# Patient Record
Sex: MALE | Race: ASIAN | Employment: FULL TIME | URBAN - METROPOLITAN AREA
[De-identification: names, ages, dates, MRNs, and addresses within clinical notes are randomized per-mention and may not be internally consistent; named-entity substitution may affect disease eponyms.]

---

## 2019-07-11 ENCOUNTER — APPOINTMENT (OUTPATIENT)
Dept: CT IMAGING | Facility: HOSPITAL | Age: 49
DRG: 638 | End: 2019-07-11
Attending: HOSPITALIST
Payer: COMMERCIAL

## 2019-07-11 ENCOUNTER — APPOINTMENT (OUTPATIENT)
Dept: ULTRASOUND IMAGING | Facility: HOSPITAL | Age: 49
DRG: 638 | End: 2019-07-11
Attending: HOSPITALIST
Payer: COMMERCIAL

## 2019-07-11 ENCOUNTER — HOSPITAL ENCOUNTER (INPATIENT)
Facility: HOSPITAL | Age: 49
LOS: 2 days | Discharge: HOME | DRG: 638 | End: 2019-07-13
Attending: INTERNAL MEDICINE | Admitting: HOSPITALIST
Payer: COMMERCIAL

## 2019-07-11 DIAGNOSIS — E10.65 TYPE 1 DIABETES MELLITUS WITH HYPERGLYCEMIA (HCC): Primary | ICD-10-CM

## 2019-07-11 LAB
ALBUMIN SERPL-MCNC: 3.6 G/DL (ref 3.4–5)
ALBUMIN/GLOB SERPL: 0.9 {RATIO} (ref 1–2)
ALP LIVER SERPL-CCNC: 123 U/L (ref 45–117)
ALT SERPL-CCNC: 639 U/L (ref 16–61)
ANION GAP SERPL CALC-SCNC: 9 MMOL/L (ref 0–18)
AST SERPL-CCNC: 496 U/L (ref 15–37)
BILIRUB SERPL-MCNC: 0.8 MG/DL (ref 0.1–2)
BUN BLD-MCNC: 16 MG/DL (ref 7–18)
BUN/CREAT SERPL: 21.1 (ref 10–20)
CALCIUM BLD-MCNC: 8.9 MG/DL (ref 8.5–10.1)
CHLORIDE SERPL-SCNC: 111 MMOL/L (ref 98–112)
CHOLEST SMN-MCNC: 180 MG/DL (ref ?–200)
CO2 SERPL-SCNC: 23 MMOL/L (ref 21–32)
CREAT BLD-MCNC: 0.76 MG/DL (ref 0.7–1.3)
EST. AVERAGE GLUCOSE BLD GHB EST-MCNC: 364 MG/DL (ref 68–126)
GLOBULIN PLAS-MCNC: 4.1 G/DL (ref 2.8–4.4)
GLUCOSE BLD-MCNC: 142 MG/DL (ref 70–99)
GLUCOSE BLD-MCNC: 225 MG/DL (ref 70–99)
GLUCOSE BLD-MCNC: 249 MG/DL (ref 70–99)
GLUCOSE BLD-MCNC: 267 MG/DL (ref 70–99)
GLUCOSE BLD-MCNC: 269 MG/DL (ref 70–99)
GLUCOSE BLD-MCNC: 285 MG/DL (ref 70–99)
HAV IGM SER QL: NONREACTIVE
HBA1C MFR BLD HPLC: 14.3 % (ref ?–5.7)
HBV CORE IGM SER QL: NONREACTIVE
HBV SURFACE AG SERPL QL IA: REACTIVE
HCV AB SERPL QL IA: NONREACTIVE
HDLC SERPL-MCNC: 14 MG/DL (ref 40–59)
LDLC SERPL CALC-MCNC: 93 MG/DL (ref ?–100)
M PROTEIN MFR SERPL ELPH: 7.7 G/DL (ref 6.4–8.2)
NONHDLC SERPL-MCNC: 166 MG/DL (ref ?–130)
OSMOLALITY SERPL CALC.SUM OF ELEC: 308 MOSM/KG (ref 275–295)
POTASSIUM SERPL-SCNC: 3.8 MMOL/L (ref 3.5–5.1)
SODIUM SERPL-SCNC: 143 MMOL/L (ref 136–145)
TRIGL SERPL-MCNC: 363 MG/DL (ref 30–149)
TSI SER-ACNC: 0.64 MIU/ML (ref 0.36–3.74)
VLDLC SERPL CALC-MCNC: 73 MG/DL (ref 0–30)

## 2019-07-11 PROCEDURE — 83516 IMMUNOASSAY NONANTIBODY: CPT | Performed by: INTERNAL MEDICINE

## 2019-07-11 PROCEDURE — 83036 HEMOGLOBIN GLYCOSYLATED A1C: CPT | Performed by: HOSPITALIST

## 2019-07-11 PROCEDURE — 84443 ASSAY THYROID STIM HORMONE: CPT | Performed by: INTERNAL MEDICINE

## 2019-07-11 PROCEDURE — 82947 ASSAY GLUCOSE BLOOD QUANT: CPT | Performed by: INTERNAL MEDICINE

## 2019-07-11 PROCEDURE — 80061 LIPID PANEL: CPT | Performed by: INTERNAL MEDICINE

## 2019-07-11 PROCEDURE — 70450 CT HEAD/BRAIN W/O DYE: CPT | Performed by: HOSPITALIST

## 2019-07-11 PROCEDURE — 82962 GLUCOSE BLOOD TEST: CPT

## 2019-07-11 PROCEDURE — 84681 ASSAY OF C-PEPTIDE: CPT | Performed by: INTERNAL MEDICINE

## 2019-07-11 PROCEDURE — 80053 COMPREHEN METABOLIC PANEL: CPT | Performed by: HOSPITALIST

## 2019-07-11 PROCEDURE — 86341 ISLET CELL ANTIBODY: CPT | Performed by: INTERNAL MEDICINE

## 2019-07-11 PROCEDURE — 80074 ACUTE HEPATITIS PANEL: CPT | Performed by: HOSPITALIST

## 2019-07-11 PROCEDURE — 86337 INSULIN ANTIBODIES: CPT | Performed by: INTERNAL MEDICINE

## 2019-07-11 PROCEDURE — 76700 US EXAM ABDOM COMPLETE: CPT | Performed by: HOSPITALIST

## 2019-07-11 RX ORDER — DEXTROSE MONOHYDRATE 25 G/50ML
50 INJECTION, SOLUTION INTRAVENOUS
Status: DISCONTINUED | OUTPATIENT
Start: 2019-07-11 | End: 2019-07-13

## 2019-07-11 RX ORDER — HEPARIN SODIUM 5000 [USP'U]/ML
5000 INJECTION, SOLUTION INTRAVENOUS; SUBCUTANEOUS EVERY 12 HOURS SCHEDULED
Status: DISCONTINUED | OUTPATIENT
Start: 2019-07-12 | End: 2019-07-13

## 2019-07-11 RX ORDER — POTASSIUM CHLORIDE 20 MEQ/1
40 TABLET, EXTENDED RELEASE ORAL ONCE
Status: DISCONTINUED | OUTPATIENT
Start: 2019-07-11 | End: 2019-07-13

## 2019-07-11 RX ORDER — ONDANSETRON 2 MG/ML
4 INJECTION INTRAMUSCULAR; INTRAVENOUS EVERY 6 HOURS PRN
Status: DISCONTINUED | OUTPATIENT
Start: 2019-07-11 | End: 2019-07-13

## 2019-07-11 RX ORDER — ONDANSETRON 2 MG/ML
4 INJECTION INTRAMUSCULAR; INTRAVENOUS EVERY 6 HOURS PRN
Status: DISCONTINUED | OUTPATIENT
Start: 2019-07-11 | End: 2019-07-11

## 2019-07-11 RX ORDER — SODIUM CHLORIDE 9 MG/ML
INJECTION, SOLUTION INTRAVENOUS CONTINUOUS
Status: DISCONTINUED | OUTPATIENT
Start: 2019-07-11 | End: 2019-07-13

## 2019-07-11 RX ORDER — ACETAMINOPHEN 500 MG
1000 TABLET ORAL EVERY 6 HOURS PRN
Status: DISCONTINUED | OUTPATIENT
Start: 2019-07-11 | End: 2019-07-12

## 2019-07-11 NOTE — PROGRESS NOTES
Patient is alert and oriented. Oxygen saturation WDL on room air. Vital signs stable. Reporting headache. Feeling week. Resting in bed. Awaiting abdominal ultrasound and head CT. Initiated insulin administration education.  Patient is not perceptive to e

## 2019-07-11 NOTE — CONSULTS
ENDOCRINOLOGY CONSULTATION    Attending physician:  Debbie Savage MD  Consulting physican:  Demi Aldana MD    Admission Date:  7/11/2019  Consultation Date:  7/11/2019      Reason for consultation: Mgmt of new onset DM    Chief Complaint:  Admit glucose (DEX4) oral liquid 15 g 15 g Oral Q15 Min PRN   Or      Glucose-Vitamin C (DEX-4) 4-6 GM-MG chewable tab 4 tablet 4 tablet Oral Q15 Min PRN   Or      dextrose 50 % injection 50 mL 50 mL Intravenous Q15 Min PRN   Or      glucose (DEX4) oral liquid Latest Ref Rng & Units 7/11/2019 7/11/2019 7/11/2019 7/11/2019           4:00 PM  3:10 PM  3:06 PM 10:23 AM   GLUCOSE             Test Strip Lot #      Numeric       Test Strip Expiration Date      Date       Glucose      70 - 99 mg/dL 269 (H) 285 (H)  509 hyperglycemia. HgA1C 14.3%. New diagnosis presenting with wt loss, polyuria and polydipsia. 2. Long term insulin use   3.  Elevated liver enzymes      · Adjust regimen as follows:     Levemir 16 Units at bedtime (start with dinner today for first time do

## 2019-07-12 ENCOUNTER — APPOINTMENT (OUTPATIENT)
Dept: ULTRASOUND IMAGING | Facility: HOSPITAL | Age: 49
DRG: 638 | End: 2019-07-12
Attending: INTERNAL MEDICINE
Payer: COMMERCIAL

## 2019-07-12 LAB
ALBUMIN SERPL-MCNC: 3.5 G/DL (ref 3.4–5)
ALBUMIN/GLOB SERPL: 0.9 {RATIO} (ref 1–2)
ALP LIVER SERPL-CCNC: 108 U/L (ref 45–117)
ALT SERPL-CCNC: 617 U/L (ref 16–61)
ANION GAP SERPL CALC-SCNC: 5 MMOL/L (ref 0–18)
AST SERPL-CCNC: 620 U/L (ref 15–37)
BILIRUB SERPL-MCNC: 1 MG/DL (ref 0.1–2)
BUN BLD-MCNC: 18 MG/DL (ref 7–18)
BUN/CREAT SERPL: 25 (ref 10–20)
CALCIUM BLD-MCNC: 8.6 MG/DL (ref 8.5–10.1)
CERULOPLASMIN SERPL-MCNC: 30.3 MG/DL (ref 20–60)
CHLORIDE SERPL-SCNC: 110 MMOL/L (ref 98–112)
CO2 SERPL-SCNC: 27 MMOL/L (ref 21–32)
CORTIS SERPL-MCNC: 23.4 UG/DL
CREAT BLD-MCNC: 0.72 MG/DL (ref 0.7–1.3)
CREAT UR-SCNC: 170 MG/DL
DEPRECATED HBV CORE AB SER IA-ACNC: 445.3 NG/ML (ref 30–490)
GLOBULIN PLAS-MCNC: 4.1 G/DL (ref 2.8–4.4)
GLUCOSE BLD-MCNC: 203 MG/DL (ref 70–99)
GLUCOSE BLD-MCNC: 231 MG/DL (ref 70–99)
GLUCOSE BLD-MCNC: 232 MG/DL (ref 70–99)
GLUCOSE BLD-MCNC: 241 MG/DL (ref 70–99)
GLUCOSE BLD-MCNC: 284 MG/DL (ref 70–99)
HBV CORE AB SERPL QL IA: REACTIVE
HBV CORE IGM SER QL: NONREACTIVE
IRON SATURATION: 46 % (ref 20–50)
IRON SERPL-MCNC: 155 UG/DL (ref 65–175)
M PROTEIN MFR SERPL ELPH: 7.6 G/DL (ref 6.4–8.2)
MICROALBUMIN UR-MCNC: 8.33 MG/DL
MICROALBUMIN/CREAT 24H UR-RTO: 49 UG/MG (ref ?–30)
OSMOLALITY SERPL CALC.SUM OF ELEC: 304 MOSM/KG (ref 275–295)
POTASSIUM SERPL-SCNC: 3.7 MMOL/L (ref 3.5–5.1)
POTASSIUM SERPL-SCNC: 3.7 MMOL/L (ref 3.5–5.1)
SODIUM SERPL-SCNC: 142 MMOL/L (ref 136–145)
TOTAL IRON BINDING CAPACITY: 337 UG/DL (ref 240–450)
TRANSFERRIN SERPL-MCNC: 226 MG/DL (ref 200–360)

## 2019-07-12 PROCEDURE — 83516 IMMUNOASSAY NONANTIBODY: CPT | Performed by: INTERNAL MEDICINE

## 2019-07-12 PROCEDURE — 87350 HEPATITIS BE AG IA: CPT | Performed by: INTERNAL MEDICINE

## 2019-07-12 PROCEDURE — 86038 ANTINUCLEAR ANTIBODIES: CPT | Performed by: INTERNAL MEDICINE

## 2019-07-12 PROCEDURE — 82390 ASSAY OF CERULOPLASMIN: CPT | Performed by: INTERNAL MEDICINE

## 2019-07-12 PROCEDURE — 82570 ASSAY OF URINE CREATININE: CPT | Performed by: INTERNAL MEDICINE

## 2019-07-12 PROCEDURE — 84132 ASSAY OF SERUM POTASSIUM: CPT | Performed by: HOSPITALIST

## 2019-07-12 PROCEDURE — 82962 GLUCOSE BLOOD TEST: CPT

## 2019-07-12 PROCEDURE — 82104 ALPHA-1-ANTITRYPSIN PHENO: CPT | Performed by: INTERNAL MEDICINE

## 2019-07-12 PROCEDURE — 83540 ASSAY OF IRON: CPT | Performed by: INTERNAL MEDICINE

## 2019-07-12 PROCEDURE — 93975 VASCULAR STUDY: CPT | Performed by: INTERNAL MEDICINE

## 2019-07-12 PROCEDURE — 82533 TOTAL CORTISOL: CPT | Performed by: INTERNAL MEDICINE

## 2019-07-12 PROCEDURE — 82728 ASSAY OF FERRITIN: CPT | Performed by: INTERNAL MEDICINE

## 2019-07-12 PROCEDURE — 82043 UR ALBUMIN QUANTITATIVE: CPT | Performed by: INTERNAL MEDICINE

## 2019-07-12 PROCEDURE — 80053 COMPREHEN METABOLIC PANEL: CPT | Performed by: INTERNAL MEDICINE

## 2019-07-12 PROCEDURE — 86705 HEP B CORE ANTIBODY IGM: CPT | Performed by: INTERNAL MEDICINE

## 2019-07-12 PROCEDURE — 83550 IRON BINDING TEST: CPT | Performed by: INTERNAL MEDICINE

## 2019-07-12 PROCEDURE — 87517 HEPATITIS B DNA QUANT: CPT | Performed by: INTERNAL MEDICINE

## 2019-07-12 PROCEDURE — 86704 HEP B CORE ANTIBODY TOTAL: CPT | Performed by: INTERNAL MEDICINE

## 2019-07-12 PROCEDURE — 82103 ALPHA-1-ANTITRYPSIN TOTAL: CPT | Performed by: INTERNAL MEDICINE

## 2019-07-12 PROCEDURE — 86707 HEPATITIS BE ANTIBODY: CPT | Performed by: INTERNAL MEDICINE

## 2019-07-12 RX ORDER — POTASSIUM CHLORIDE 20 MEQ/1
40 TABLET, EXTENDED RELEASE ORAL ONCE
Status: COMPLETED | OUTPATIENT
Start: 2019-07-12 | End: 2019-07-12

## 2019-07-12 RX ORDER — ACETAMINOPHEN 325 MG/1
650 TABLET ORAL EVERY 6 HOURS PRN
Status: DISCONTINUED | OUTPATIENT
Start: 2019-07-12 | End: 2019-07-13

## 2019-07-12 NOTE — PROGRESS NOTES
Pt is in with new onset dm, encouraged pt to watch videos, and Ever Sheehan did much teraching with pt, this writer had pt give own insulin. Us abd done. RA, lungs clear sats >92%, k 3.7 was replaced, IVF infusing. Wife at bedside. will monitor

## 2019-07-12 NOTE — DIETARY NOTE
[address] [place] [address]     Admitting diagnosis:  new onset Diabetes     Ht:  5'3\"  Wt: 52.6 kg . This is 93% of IBW  There is no height or weight on file to calculate BMI.   IBW: 56kg  Wt Readings from Last 6 Encounters:

## 2019-07-12 NOTE — CONSULTS
[facility]  Diabetes Clinical Nurse Specialist Consult Note    Curtis Gates Patient Status:  Inpatient    1970 MRN [id number]   [facility] 5NW-A Attending Osiel Walls MD   Hosp Day # 1 PCP Servando Prado MD     Reason patient regarding diet, medications, side effects, treatment options, discharge planning.       Education Provided:    · \"Understanding Diabetes: Basic Survival Skills\" booklet given  · [facility] Diabetes Educator infomration given  · How to test blood glucose

## 2019-07-12 NOTE — H&P
[facility] Hospitalist H&P       CC: No chief complaint on file.        PCP: Akil Cavanaugh MD    History of Present Illness:  Patient is a 51year old male with no significant pmhx presenting with several month h/o weight loss, increased thirst, increased reports is historic)  GI: soft/ND, NT, +BS  Ext: nonedematous b/ LE  Neuro: no focal deficits  Skin: no rashes/lesions  Psych: normal mood/affect      Diagnostic Data:      Recent Labs   Lab 07/11/19  1023   WBC 4.84   HGB 15.9   MCV 93.3   *

## 2019-07-12 NOTE — PROGRESS NOTES
[facility]  Progress Note    Camilo Wilson Patient Status:  Inpatient    1970 MRN [id number]   [facility] 5NW-A Attending Joo Beard MD   Hosp Day # 1 PCP Jamin Hermosillo MD     Assessment/Plan:  Patient Active Problem --    A1C  --   --   --  14.3*  --   --   --    PGLU  --    < >  --   --    < > 231*  --    CA 10.1  --  8.9  --   --   --   --    ALB 4.2  --  3.6  --   --   --   --     < > = values in this interval not displayed.      Lab Results   Component Value Date

## 2019-07-12 NOTE — CONSULTS
[facility]    Report of Consultation    [facility] Patient Status:  Inpatient    1970 MRN [id number]   [facility] 5NW-A Attending Jackie Jefferson MD   Hosp Day # 1 PCP Manuela Veras MD     Date of Admission:   Nightly  •  ondansetron HCl (ZOFRAN) injection 4 mg, 4 mg, Intravenous, Q6H PRN  •  acetaminophen (TYLENOL EXTRA STRENGTH) tab 1,000 mg, 1,000 mg, Oral, Q6H PRN  •  0.9% NaCl infusion, , Intravenous, Continuous  •  glucose (DEX4) oral liquid 15 g, 15 g, Or high cholesterol or triglycerides, coronary stents. Respiratory: Denies shortness of breath, chronic/frequent hoarseness, wheezing, exposure to tuberculosis, chronic cough, spitting up blood, cough up sputum, sleep apnea.   Genitourinary: Denies kidney sto 18 07/12/2019     07/12/2019    K 3.7 07/12/2019    K 3.7 07/12/2019     07/12/2019    CO2 27.0 07/12/2019     07/12/2019    CA 8.6 07/12/2019    ALB 3.5 07/12/2019    ALKPHO 108 07/12/2019    BILT 1.0 07/12/2019    TP 7.6 07/12/2019

## 2019-07-12 NOTE — PROGRESS NOTES
Pt has requested to eat and wants US in [place] is ok with this, and pt is happy. Will keep pt NPO after [address] [place]. 1050 am, US called and stated they will send for him now nad pt has not eaten yet.

## 2019-07-13 VITALS
TEMPERATURE: 98 F | SYSTOLIC BLOOD PRESSURE: 135 MMHG | DIASTOLIC BLOOD PRESSURE: 83 MMHG | HEART RATE: 73 BPM | RESPIRATION RATE: 18 BRPM | OXYGEN SATURATION: 98 %

## 2019-07-13 LAB
ALBUMIN SERPL-MCNC: 3 G/DL (ref 3.4–5)
ALBUMIN/GLOB SERPL: 0.9 {RATIO} (ref 1–2)
ALP LIVER SERPL-CCNC: 90 U/L (ref 45–117)
ALT SERPL-CCNC: 576 U/L (ref 16–61)
ANION GAP SERPL CALC-SCNC: 6 MMOL/L (ref 0–18)
AST SERPL-CCNC: 655 U/L (ref 15–37)
BILIRUB SERPL-MCNC: 0.9 MG/DL (ref 0.1–2)
BUN BLD-MCNC: 15 MG/DL (ref 7–18)
BUN/CREAT SERPL: 28.3 (ref 10–20)
C-PEPTIDE, SERUM OR PLASMA: 0.5 NG/ML
CALCIUM BLD-MCNC: 8.1 MG/DL (ref 8.5–10.1)
CHLORIDE SERPL-SCNC: 109 MMOL/L (ref 98–112)
CO2 SERPL-SCNC: 26 MMOL/L (ref 21–32)
CREAT BLD-MCNC: 0.53 MG/DL (ref 0.7–1.3)
F-ACTIN (SMOOTH MUSCLE) AB: 12 UNITS
GLOBULIN PLAS-MCNC: 3.4 G/DL (ref 2.8–4.4)
GLUCOSE BLD-MCNC: 115 MG/DL (ref 70–99)
GLUCOSE BLD-MCNC: 117 MG/DL (ref 70–99)
GLUCOSE BLD-MCNC: 205 MG/DL (ref 70–99)
M PROTEIN MFR SERPL ELPH: 6.4 G/DL (ref 6.4–8.2)
OSMOLALITY SERPL CALC.SUM OF ELEC: 294 MOSM/KG (ref 275–295)
POTASSIUM SERPL-SCNC: 3.3 MMOL/L (ref 3.5–5.1)
SODIUM SERPL-SCNC: 141 MMOL/L (ref 136–145)

## 2019-07-13 PROCEDURE — 80053 COMPREHEN METABOLIC PANEL: CPT | Performed by: INTERNAL MEDICINE

## 2019-07-13 PROCEDURE — 82962 GLUCOSE BLOOD TEST: CPT

## 2019-07-13 RX ORDER — POTASSIUM CHLORIDE 20 MEQ/1
40 TABLET, EXTENDED RELEASE ORAL EVERY 4 HOURS
Status: COMPLETED | OUTPATIENT
Start: 2019-07-13 | End: 2019-07-13

## 2019-07-13 RX ORDER — BLOOD-GLUCOSE METER
KIT MISCELLANEOUS
Qty: 1 KIT | Refills: 0 | Status: SHIPPED | OUTPATIENT
Start: 2019-07-13 | End: 2020-01-09

## 2019-07-13 RX ORDER — LANCETS 28 GAUGE
EACH MISCELLANEOUS
Qty: 50 EACH | Refills: 6 | Status: SHIPPED | OUTPATIENT
Start: 2019-07-13 | End: 2020-01-09

## 2019-07-13 RX ORDER — BLOOD SUGAR DIAGNOSTIC
STRIP MISCELLANEOUS
Qty: 50 STRIP | Refills: 6 | Status: SHIPPED | OUTPATIENT
Start: 2019-07-13 | End: 2020-01-09

## 2019-07-13 NOTE — PROGRESS NOTES
NURSING DISCHARGE NOTE    Discharged Home via Ambulatory. Accompanied by Spouse  Belongings Taken by patient/family. Patient performed breakfast and lunch insulin injections, as well as determining how much insulin to give.   Pt and spouse verbalize

## 2019-07-13 NOTE — PROGRESS NOTES
[facility] Hospitalist Progress Note     Zeb Wade Patient Status:  Inpatient    1970 MRN [id number]   [facility] 5NW-A Attending Ken Umaña MD   Hosp Day # 1 PCP Osmany Evans MD     CC: follow up    SUBJECTIVE:  No CP, SO 07/12/19 0155     PRN Medication:ondansetron HCl, acetaminophen, glucose **OR** Glucose-Vitamin C **OR** dextrose **OR** glucose **OR** Glucose-Vitamin C        Assessment/Plan:     # new onset diabetes  - BG 500s -- now in 200s, anion gap 11  - apprec end

## 2019-07-13 NOTE — PROGRESS NOTES
[facility]    Progress Note    MARGIEmiller Mary Carmen Patient Status:  Inpatient    1970 MRN [id number]   [facility] 5NW-A Attending Harriet Rodney MD   Hosp Day # 2 PCP Any Niño MD     Subjective:  Kate Bustamanteito is a(n) weeks to fully return.       Pt will need q6 month US liver to screen for hepatoma.     Pt and wife instructed that all family member should be screened for hepatitis b and if negative, they should be vaccinated if not yet already done.     For f/u pt can

## 2019-07-13 NOTE — PROGRESS NOTES
[facility] Hospitalist Progress Note     Jessica Apo Patient Status:  Inpatient    1970 MRN [id number]   [facility] 5NW-A Attending Nubia Rendon MD   Hosp Day # 2 PCP Dank Santoyo MD     CC: follow up    SUBJECTIVE:  No CP, SO Units Subcutaneous 2 times per day     Continuous Infusing Medication:  • sodium chloride 100 mL/hr at 07/13/19 0029     PRN Medication:acetaminophen, ondansetron HCl, glucose **OR** Glucose-Vitamin C **OR** dextrose **OR** glucose **OR** Glucose-Vitamin C

## 2019-07-13 NOTE — PROGRESS NOTES
[facility]  Progress Note    Camilo Wilson Patient Status:  Inpatient    1970 MRN [id number]   [facility] 5NW-A Attending Joo Beard MD   Hosp Day # 2 PCP Jamin Hermosillo MD     Assessment/Plan:  Patient Active Problem --  109   CO2 26.7  --  23.0  --   --  27.0  --   --  26.0   BUN 20.0  --  16  --   --  18  --   --  15   CREATSERUM 0.99  --  0.76  --   --  0.72  --   --  0.53*   A1C  --   --   --  14.3*  --   --   --   --   --    PGLU  --    < >  --   --    < >  --

## 2019-07-13 NOTE — DISCHARGE SUMMARY
General Medicine Discharge Summary     Patient ID:  Camilo Wilson  51year old  2/23/1970    Admit date: 7/11/2019    Discharge date and time: 7/13/2019    Attending Physician: Joo Beard MD     [facility] reviewed - notable for small VSD. Grade 2 diastolic dyfunc. Nl EF.       Consults: IP CONSULT TO ENDOCRINOLOGY  NURSING CONSULT TO DIETITIAN  IP CONSULT TO GASTROENTEROLOGY  IP CONSULT TO CLINICAL NURSE SPECIALIST  IP CONSULT TO FOOD AND NUTRITION SERVICES transcribed by Technologist)     FINDINGS:  LIVER:  Diffusely increased echogenicity. No focal lesion. BILIARY:  Mm gallbladder polyp. No gallstone or bile duct dilatation, the common duct measures 2 mm.  PANCREAS:  Normal. SPLEEN:  Normal. KIDNEYS:  Norm into the skin TID CC and HS. Give 1 unit of Novolog (aspart) insulin for every 20 points blood glucose is greater than 140 mg/dL    !!  Insulin Aspart Pen 100 UNIT/ML Subcutaneous Solution Pen-injector  Inject 5 Units into the skin 3 (three) times daily wit minutes spent throughout day of discharge.      Alessia Cross MD  [facility] Hospitalist

## 2019-07-14 LAB
ALPHA-1-ANTITRYPSIN: 120 MG/DL
GLUTAMIC ACID DECARBOXYLASE AB: <5 IU/ML
HEPATITIS BE VIRUS ANTIGEN: NEGATIVE
MITOCHONDRIAL M2 AB, IGG: 7.6 UNITS

## 2019-07-15 LAB
ANA SCREEN: NEGATIVE
HEPATITIS BE VIRUS ANTIBODY: POSITIVE
INSULIN ANTIBODY: <0.4 U/ML

## 2019-07-16 LAB — ISLET ANTIGEN-2 ANTIBODY: <5.4 U/ML

## 2019-07-18 ENCOUNTER — TELEPHONE (OUTPATIENT)
Dept: CASE MANAGEMENT | Age: 49
End: 2019-07-18

## 2019-07-18 NOTE — TELEPHONE ENCOUNTER
7/19 update:  Patient has HFU with PCP appt today    Called patient to offer assistance in making his HFU with PCP per post dc f/u order.  MAITE

## 2019-07-21 NOTE — PROGRESS NOTES
These labs are normal except C-peptide is low - they were ordered when we saw him in the hospital    Pt will be seeing Dr. Henok Contreras this week      Future Appointments  7/24/2019  10:15 AM   Chacha Cameron MD             [address] [facility]

## 2019-07-22 LAB
HBV DNA SERPL NAA+PROBE-ACNC: ABNORMAL IU/ML
HBV DNA SERPL NAA+PROBE-LOG IU: 6.71 LOG (IU/ML)
HBV DNA SERPL QL NAA+PROBE: DETECTED

## 2019-07-23 NOTE — PROGRESS NOTES
Here are your results from your recent visit with Gastroenterology. Richar Gomez, your hepatitis B labs returned showing that you have a very high level of virus in your blood. You have >5 million virus in a ml of blood.   I also suspect that you have chr

## 2019-07-24 PROBLEM — R74.01 TRANSAMINITIS: Status: ACTIVE | Noted: 2019-07-24

## 2019-07-30 PROCEDURE — 86692 HEPATITIS DELTA AGENT ANTBDY: CPT | Performed by: INTERNAL MEDICINE

## 2019-07-30 PROCEDURE — 87912 NFCT AGT GNTYP ALYS HEP B: CPT | Performed by: INTERNAL MEDICINE

## 2019-12-12 PROBLEM — Q21.0 VSD (VENTRICULAR SEPTAL DEFECT), PERIMEMBRANOUS: Status: ACTIVE | Noted: 2019-12-12

## 2021-04-07 PROBLEM — E11.65 UNCONTROLLED TYPE 2 DIABETES MELLITUS WITH HYPERGLYCEMIA (HCC): Status: ACTIVE | Noted: 2021-04-07

## 2021-05-26 PROBLEM — J34.2 NASAL SEPTAL DEVIATION: Status: ACTIVE | Noted: 2021-05-26

## 2021-05-26 PROBLEM — R04.0 EPISTAXIS: Status: ACTIVE | Noted: 2021-05-26

## 2021-08-25 PROBLEM — E11.65 UNCONTROLLED TYPE 2 DIABETES MELLITUS WITH HYPERGLYCEMIA (HCC): Status: RESOLVED | Noted: 2021-04-07 | Resolved: 2021-08-25

## (undated) NOTE — LETTER
Date: 7/13/2019    Patient Name: Shilpi Ritter          To Whom it may concern: The above patient was seen at the [facility] for treatment of a medical condition. Mr. Grace Galdamez was admitted to [facility] from 7/11/19-7/13/19.     Mr. Lewis Evans

## (undated) NOTE — LETTER
7/23/2019    [address] [address] [place] [id number]    Dear Sunil Aleman,           Here are your results from your recent visit with Gastroenterology.        Sunil Aleman, your hepatitis B labs returned showing that you have a very high le